# Patient Record
Sex: FEMALE | Race: WHITE | URBAN - METROPOLITAN AREA
[De-identification: names, ages, dates, MRNs, and addresses within clinical notes are randomized per-mention and may not be internally consistent; named-entity substitution may affect disease eponyms.]

---

## 2021-04-08 ENCOUNTER — OFFICE VISIT (OUTPATIENT)
Dept: URBAN - METROPOLITAN AREA CLINIC 30 | Facility: CLINIC | Age: 81
End: 2021-04-08
Payer: MEDICARE

## 2021-04-08 DIAGNOSIS — H04.123 DRY EYE SYNDROME OF BILATERAL LACRIMAL GLANDS: ICD-10-CM

## 2021-04-08 DIAGNOSIS — H43.813 VITREOUS DEGENERATION, BILATERAL: Primary | ICD-10-CM

## 2021-04-08 DIAGNOSIS — H02.135 SENILE ECTROPION OF LEFT LOWER EYELID: ICD-10-CM

## 2021-04-08 DIAGNOSIS — H25.813 COMBINED FORMS OF AGE-RELATED CATARACT, BILATERAL: ICD-10-CM

## 2021-04-08 PROCEDURE — 99204 OFFICE O/P NEW MOD 45 MIN: CPT | Performed by: OPTOMETRIST

## 2021-04-08 PROCEDURE — 92134 CPTRZ OPH DX IMG PST SGM RTA: CPT | Performed by: OPTOMETRIST

## 2021-04-08 RX ORDER — PREDNISOLONE ACETATE 10 MG/ML
1 % SUSPENSION/ DROPS OPHTHALMIC
Qty: 10 | Refills: 0 | Status: INACTIVE
Start: 2021-04-08 | End: 2021-07-15

## 2021-04-08 ASSESSMENT — INTRAOCULAR PRESSURE
OD: 17
OS: 17

## 2021-04-08 ASSESSMENT — KERATOMETRY
OS: 45.49
OD: 45.52

## 2021-04-08 ASSESSMENT — VISUAL ACUITY
OD: 20/50
OS: 20/30

## 2021-04-08 NOTE — IMPRESSION/PLAN
Impression: Combined forms of age-related cataract, bilateral: H25.813. Plan: Discussed cataracts. Recommend cataract surgery OU, OD first. Patient elects surgery. Patient is not a candidate for MF IOL. Aim OD: plano Aim OS: plano. Patient notes allergies to multiple medications including commonly used surgical medications and latex. She also notes problems with waking from general anesthesia.

## 2021-04-08 NOTE — IMPRESSION/PLAN
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 Hx of punctal plugs- pt did not find them helpful Plan: Dryness noted on exam today. Patient has been using Restasis BID OU for ~ the past 5 years. d/c Restasis for now due to level of dryness and trial Xiidra BID OU. Start PA 1% TID OU x 2 weeks.

## 2021-04-08 NOTE — IMPRESSION/PLAN
Impression: Vitreous degeneration, bilateral: H43.813. Plan: Pt educ on findings. Retinas are flat and attached OU. Reviewed s/sx of RD and to call asap if occurs. MAC OCT WNL OU.

## 2021-06-01 ENCOUNTER — ADULT PHYSICAL (OUTPATIENT)
Dept: URBAN - METROPOLITAN AREA CLINIC 30 | Facility: CLINIC | Age: 81
End: 2021-06-01
Payer: MEDICARE

## 2021-06-01 DIAGNOSIS — Z01.818 ENCOUNTER FOR OTHER PREPROCEDURAL EXAMINATION: Primary | ICD-10-CM

## 2021-06-01 PROCEDURE — 99203 OFFICE O/P NEW LOW 30 MIN: CPT | Performed by: PHYSICIAN ASSISTANT

## 2021-06-01 RX ORDER — SIMVASTATIN 20 MG/1
20 MG TABLET, FILM COATED ORAL
Qty: 0 | Refills: 0 | Status: ACTIVE
Start: 2021-06-01

## 2021-06-01 RX ORDER — VALSARTAN 320 MG/1
320 MG TABLET ORAL
Qty: 0 | Refills: 0 | Status: ACTIVE
Start: 2021-06-01

## 2021-06-01 RX ORDER — CLONIDINE HYDROCHLORIDE 0.2 MG/1
0.2 MG TABLET ORAL
Qty: 0 | Refills: 0 | Status: ACTIVE
Start: 2021-06-01

## 2021-06-09 ENCOUNTER — PRE-OPERATIVE VISIT (OUTPATIENT)
Dept: URBAN - METROPOLITAN AREA CLINIC 24 | Facility: CLINIC | Age: 81
End: 2021-06-09
Payer: MEDICARE

## 2021-06-09 PROCEDURE — 92136 OPHTHALMIC BIOMETRY: CPT | Performed by: OPHTHALMOLOGY

## 2021-06-09 PROCEDURE — 99204 OFFICE O/P NEW MOD 45 MIN: CPT | Performed by: OPHTHALMOLOGY

## 2021-06-09 ASSESSMENT — PACHYMETRY
OD: 3.15
OD: 23.22
OS: 23.14
OS: 2.87

## 2021-06-09 ASSESSMENT — INTRAOCULAR PRESSURE
OD: 13
OS: 12

## 2021-06-09 NOTE — IMPRESSION/PLAN
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 Hx of punctal plugs- pt did not find them helpful Plan: continue using Restasis BID OU for ~ the past 5 years. Finish PA 1% TID OU x 2 weeks. Discussed with patient, understands this may limit vision after surgery.

## 2021-06-09 NOTE — IMPRESSION/PLAN
Impression: Combined forms of age-related cataract, bilateral: H25.813. Plan: Discussed cataract diagnosis with the patient. Discussed risks, benefits and alternatives to surgery including but not limited to: bleeding, infection, risk of vision loss, loss of the eye, need for other surgery. Patient voiced understanding and wishes to proceed. Patient elects surgical treatment. Advanced technology options Discussed with patient . Patient desires surgery OU, OD  first (( AIM -0.25  OU, DEXYCU, Topical anesthesia, NO Lensx OU, NO ORA OU, NO LRI OU, AMP)) Patient understands the need for glasses after surgery for BCVA.

## 2021-06-09 NOTE — IMPRESSION/PLAN
Impression: Combined forms of age-related cataract, left eye: H25.812. Plan: PLAN: OK to proceed with cataract surgery after first eye cleared.

## 2021-06-15 ENCOUNTER — SURGERY (OUTPATIENT)
Dept: URBAN - METROPOLITAN AREA SURGERY 12 | Facility: SURGERY | Age: 81
End: 2021-06-15
Payer: MEDICARE

## 2021-06-15 PROCEDURE — 66984 XCAPSL CTRC RMVL W/O ECP: CPT | Performed by: OPHTHALMOLOGY

## 2021-06-16 ENCOUNTER — POST-OPERATIVE VISIT (OUTPATIENT)
Dept: URBAN - METROPOLITAN AREA CLINIC 30 | Facility: CLINIC | Age: 81
End: 2021-06-16
Payer: MEDICARE

## 2021-06-16 PROCEDURE — 99024 POSTOP FOLLOW-UP VISIT: CPT | Performed by: OPTOMETRIST

## 2021-06-16 RX ORDER — LIFITEGRAST 50 MG/ML
5 % SOLUTION/ DROPS OPHTHALMIC
Qty: 60 | Refills: 5 | Status: INACTIVE
Start: 2021-06-16 | End: 2021-12-15

## 2021-06-16 ASSESSMENT — INTRAOCULAR PRESSURE: OD: 14

## 2021-06-16 NOTE — IMPRESSION/PLAN
Impression: S/P Cataract Extraction by phacoemulsification with IOL placement OD - 1 Day. Encounter for surgical aftercare following surgery on a sense organ  Z48.810.  Plan: answered questions,  cont Precious Banks, stop pred
rtc for 1 week PO

## 2021-06-23 ENCOUNTER — POST-OPERATIVE VISIT (OUTPATIENT)
Dept: URBAN - METROPOLITAN AREA CLINIC 30 | Facility: CLINIC | Age: 81
End: 2021-06-23
Payer: MEDICARE

## 2021-06-23 PROCEDURE — 99024 POSTOP FOLLOW-UP VISIT: CPT | Performed by: OPTOMETRIST

## 2021-06-23 ASSESSMENT — INTRAOCULAR PRESSURE
OD: 14
OS: 16

## 2021-06-23 ASSESSMENT — KERATOMETRY
OS: 45.43
OD: 45.23

## 2021-06-23 ASSESSMENT — VISUAL ACUITY
OD: 20/40
OS: 20/40

## 2021-06-23 NOTE — IMPRESSION/PLAN
Impression:  Encounter for surgical aftercare following surgery on a sense organ  Z48.810. Plan: pt had a fall 6 days ago and went to ER, had a concussion. Postpone 2nd eye at this time. Check OD in 3 weeks. Cont Xiidra BID OU.

## 2021-07-15 ENCOUNTER — POST-OPERATIVE VISIT (OUTPATIENT)
Dept: URBAN - METROPOLITAN AREA CLINIC 30 | Facility: CLINIC | Age: 81
End: 2021-07-15
Payer: MEDICARE

## 2021-07-15 DIAGNOSIS — Z48.810 ENCOUNTER FOR SURGICAL AFTERCARE FOLLOWING SURGERY ON A SENSE ORGAN: Primary | ICD-10-CM

## 2021-07-15 PROCEDURE — 99024 POSTOP FOLLOW-UP VISIT: CPT | Performed by: OPTOMETRIST

## 2021-07-15 ASSESSMENT — VISUAL ACUITY
OS: 20/40
OD: 20/30

## 2021-07-15 ASSESSMENT — KERATOMETRY
OD: 45.77
OS: 45.46

## 2021-07-15 ASSESSMENT — INTRAOCULAR PRESSURE
OD: 17
OS: 19

## 2021-07-15 NOTE — IMPRESSION/PLAN
Impression: S/P Cataract Extraction by phacoemulsification with IOL placement OD - 30 Days. Encounter for surgical aftercare following surgery on a sense organ  Z48.810. Plan: Pt reassured BCVA has improved since her initial visit. Had concussion June 2020. Discussed sx for OS, she does not feel she has improved much since having sx, so would wait on sx OS. We decided to recheck in 4 months, CE at that time. New spec Rx today per pt preference. Advised that vision may changes once she proceeds with CAT Sx OS.

## 2021-08-04 ENCOUNTER — POST-OPERATIVE VISIT (OUTPATIENT)
Dept: URBAN - METROPOLITAN AREA CLINIC 30 | Facility: CLINIC | Age: 81
End: 2021-08-04
Payer: MEDICARE

## 2021-08-04 PROCEDURE — 99024 POSTOP FOLLOW-UP VISIT: CPT | Performed by: OPTOMETRIST

## 2021-08-04 ASSESSMENT — INTRAOCULAR PRESSURE
OD: 16
OS: 16

## 2021-08-04 NOTE — IMPRESSION/PLAN
Impression:  Encounter for surgical aftercare following surgery on a sense organ  Z48.810. Plan: Post seg exam grossly WNL. allow time for adaptation. Disc options for PPVIT in future. RTC as scheduled. RTC sooner if problem worsens.

## 2021-11-01 ENCOUNTER — OFFICE VISIT (OUTPATIENT)
Dept: URBAN - METROPOLITAN AREA CLINIC 30 | Facility: CLINIC | Age: 81
End: 2021-11-01
Payer: MEDICARE

## 2021-11-01 DIAGNOSIS — H25.812 COMBINED FORMS OF AGE-RELATED CATARACT, LEFT EYE: ICD-10-CM

## 2021-11-01 DIAGNOSIS — Z96.1 PRESENCE OF INTRAOCULAR LENS: ICD-10-CM

## 2021-11-01 PROCEDURE — 99214 OFFICE O/P EST MOD 30 MIN: CPT | Performed by: OPTOMETRIST

## 2021-11-01 PROCEDURE — 92134 CPTRZ OPH DX IMG PST SGM RTA: CPT | Performed by: OPTOMETRIST

## 2021-11-01 RX ORDER — POLYETHYLENE GLYCOL 400 AND PROPYLENE GLYCOL 4; 3 MG/ML; MG/ML
SOLUTION/ DROPS OPHTHALMIC
Qty: 0 | Refills: 0 | Status: ACTIVE
Start: 2021-11-01

## 2021-11-01 ASSESSMENT — VISUAL ACUITY
OS: 20/30
OD: 20/25

## 2021-11-01 ASSESSMENT — KERATOMETRY
OS: 45.37
OD: 45.60

## 2021-11-01 NOTE — IMPRESSION/PLAN
Impression: Vitreous degeneration, bilateral: H43.813. Plan: Stable. Retinas are flat and attached OU. Reviewed s/sx of RD and to call asap if occurs. MAC OCT WNL OU.

## 2021-11-01 NOTE — IMPRESSION/PLAN
Impression: Combined forms of age-related cataract, left eye: H25.812. Plan: Discussed cataracts with patient. Discussed treatment options. Surgical treatment is recommended. Surgical risks and benefits discussed. Patient elects surgical treatment. Recommend surgery OS. Patient is candidate/interested in toric lens/astigmatism correction, standard lens, LenSx, ORA. Aim OS: plano. Bilateral dryness, bilateral PVD.

## 2021-11-01 NOTE — IMPRESSION/PLAN
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 Hx of punctal plugs- pt did not find them helpful Plan: Using Xiidra BID OU, feels this is helpful, prefers over Restasis. Uses Systane prn. Continue Xiidra BID OU and Systane increasing prn.

## 2022-01-26 ENCOUNTER — OFFICE VISIT (OUTPATIENT)
Dept: URBAN - METROPOLITAN AREA CLINIC 30 | Facility: CLINIC | Age: 82
End: 2022-01-26
Payer: MEDICARE

## 2022-01-26 DIAGNOSIS — H53.19 OTHER SUBJECTIVE VISUAL DISTURBANCES: ICD-10-CM

## 2022-01-26 PROCEDURE — 92134 CPTRZ OPH DX IMG PST SGM RTA: CPT | Performed by: OPTOMETRIST

## 2022-01-26 PROCEDURE — 99213 OFFICE O/P EST LOW 20 MIN: CPT | Performed by: OPTOMETRIST

## 2022-01-26 ASSESSMENT — INTRAOCULAR PRESSURE
OS: 15
OD: 13

## 2022-01-26 NOTE — IMPRESSION/PLAN
Impression: Vitreous degeneration, bilateral: H43.813. Plan: Retinas are flat and attached OU. Reviewed s/sx of RD and to call asap if occurs. MAC OCT WNL OU.

## 2022-01-26 NOTE — IMPRESSION/PLAN
Impression: Other subjective visual disturbances: H53.19. Plan: Has had 2 episodes of apparent scotoma OD lasting few minutes per patient, near central vision. Seeing renal specialist, was told BP was very low at last visit (usually high per pt). Sees cardiologist approx q6months. Consider neuro or vascular etiology, watch blood pressure closely (could be related to low blood pressure). Consider carotid doppler, echocardiogram, possibly neuroimaging. Reviewed s/sx of CVA and to go to ER asap if occurs. No ocular findings to explain.

## 2022-10-24 ENCOUNTER — OFFICE VISIT (OUTPATIENT)
Dept: URBAN - METROPOLITAN AREA CLINIC 30 | Facility: CLINIC | Age: 82
End: 2022-10-24
Payer: MEDICARE

## 2022-10-24 DIAGNOSIS — H04.123 DRY EYE SYNDROME OF BILATERAL LACRIMAL GLANDS: ICD-10-CM

## 2022-10-24 DIAGNOSIS — H43.813 VITREOUS DEGENERATION, BILATERAL: ICD-10-CM

## 2022-10-24 DIAGNOSIS — H35.62 RETINAL HEMORRHAGE, LEFT EYE: ICD-10-CM

## 2022-10-24 DIAGNOSIS — H26.491 OTHER SECONDARY CATARACT, RIGHT EYE: ICD-10-CM

## 2022-10-24 DIAGNOSIS — H53.19 OTHER SUBJECTIVE VISUAL DISTURBANCES: Primary | ICD-10-CM

## 2022-10-24 PROCEDURE — 99213 OFFICE O/P EST LOW 20 MIN: CPT | Performed by: OPTOMETRIST

## 2022-10-24 PROCEDURE — 92134 CPTRZ OPH DX IMG PST SGM RTA: CPT | Performed by: OPTOMETRIST

## 2022-10-24 ASSESSMENT — KERATOMETRY
OD: 45.33
OS: 45.45

## 2022-10-24 ASSESSMENT — VISUAL ACUITY
OS: 20/30
OD: 20/20

## 2022-10-24 ASSESSMENT — INTRAOCULAR PRESSURE
OD: 15
OS: 16

## 2022-10-24 NOTE — IMPRESSION/PLAN
Impression: Other subjective visual disturbances: H53.19. Plan: Had 2 episodes of apparent scotoma OD Jan 2022 lasting few minutes per patient, near central vision. Has NOT RECURRED, but seeing dark spots in vision OD>OS. Seeing renal specialist, was told BP was very low at last visit (usually high per pt). Sees cardiologist approx q6months-recently had echocardiogram 10/22, was normal. Has had carotid ultrasound in the past.  Patient is scheduled for appt c PCP tomorrow. Reviewed s/sx of CVA and to go to ER asap if occurs.

## 2022-10-24 NOTE — IMPRESSION/PLAN
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 Plan: Nasal disc heme noted on exam today. MAC OCT is stable today, WNL OU. Today's PCP: 145/74.

## 2022-10-24 NOTE — IMPRESSION/PLAN
Impression: Other secondary cataract, right eye: H26.491. Plan: PCO OD noted on exam today. Pt educated. Will monitor for now.

## 2022-10-24 NOTE — IMPRESSION/PLAN
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 Hx of punctal plugs- pt did not find them helpful Plan: Still using Xiidra BID OU, feels this is helpful, prefers over Restasis. Uses Systane prn. Continue Xiidra BID OU and Systane increasing prn. Improved on exam today, mild dryness noted.